# Patient Record
Sex: MALE | Race: WHITE | Employment: FULL TIME | URBAN - METROPOLITAN AREA
[De-identification: names, ages, dates, MRNs, and addresses within clinical notes are randomized per-mention and may not be internally consistent; named-entity substitution may affect disease eponyms.]

---

## 2021-02-04 ENCOUNTER — HOSPITAL ENCOUNTER (EMERGENCY)
Age: 61
Discharge: HOME | End: 2021-02-04
Attending: EMERGENCY MEDICINE
Payer: COMMERCIAL

## 2021-02-04 VITALS
RESPIRATION RATE: 18 BRPM | OXYGEN SATURATION: 98 % | DIASTOLIC BLOOD PRESSURE: 78 MMHG | HEART RATE: 73 BPM | SYSTOLIC BLOOD PRESSURE: 171 MMHG | TEMPERATURE: 98 F

## 2021-02-04 DIAGNOSIS — S01.01XA LACERATION OF SCALP, INITIAL ENCOUNTER: ICD-10-CM

## 2021-02-04 DIAGNOSIS — S09.90XA CLOSED HEAD INJURY, INITIAL ENCOUNTER: Primary | ICD-10-CM

## 2021-02-04 PROCEDURE — 99283 EMERGENCY DEPT VISIT LOW MDM: CPT

## 2021-02-04 PROCEDURE — 74011000250 HC RX REV CODE- 250: Performed by: PHYSICIAN ASSISTANT

## 2021-02-04 PROCEDURE — 75810000293 HC SIMP/SUPERF WND  RPR

## 2021-02-04 RX ORDER — BACITRACIN 500 UNIT/G
1 PACKET (EA) TOPICAL 3 TIMES DAILY
Status: DISCONTINUED | OUTPATIENT
Start: 2021-02-04 | End: 2021-02-04 | Stop reason: HOSPADM

## 2021-02-04 RX ORDER — LIDOCAINE HYDROCHLORIDE 20 MG/ML
10 INJECTION, SOLUTION INFILTRATION; PERINEURAL
Status: COMPLETED | OUTPATIENT
Start: 2021-02-04 | End: 2021-02-04

## 2021-02-04 RX ADMIN — BACITRACIN 1 PACKET: 500 OINTMENT TOPICAL at 01:26

## 2021-02-04 RX ADMIN — LIDOCAINE HYDROCHLORIDE 200 MG: 20 INJECTION, SOLUTION INFILTRATION; PERINEURAL at 01:24

## 2021-02-04 NOTE — ED PROVIDER NOTES
23-year-old male history of high cholesterol, hypertension presenting to the ED for a fall. Patient notes that just prior to arrival he was attempting to hang a picture up on a ladder, was maybe 1 to 2 feet off the ground, notes that his socks slipped on the wrong causing him to fall. Patient states he did not hit his head when he fell, but notes that the picture fell down off the wall and hit the back of his scalp, sustained a large laceration. Patient specifically denies headache, LOC, nausea, vomiting, vision changes, dizziness, numbness, weakness. Denies any prodromal symptoms including chest pain or shortness of breath. Denies recent illness. Does admit that he had \"2-1/2 beers\" tonight. Denies neck pain, back pain, chest pain, shortness of breath. Past medical history: As above  Social history: As above works at  for [employer]    The history is provided by the patient. Laceration          Past Medical History:   Diagnosis Date    HLD (hyperlipidemia)     Hypertension        History reviewed. No pertinent surgical history. History reviewed. No pertinent family history.     Social History     Socioeconomic History    Marital status: SINGLE     Spouse name: Not on file    Number of children: Not on file    Years of education: Not on file    Highest education level: Not on file   Occupational History    Not on file   Social Needs    Financial resource strain: Not on file    Food insecurity     Worry: Not on file     Inability: Not on file    Transportation needs     Medical: Not on file     Non-medical: Not on file   Tobacco Use    Smoking status: Never Smoker    Smokeless tobacco: Never Used   Substance and Sexual Activity    Alcohol use: Yes     Comment: 10 beers a week    Drug use: Not on file    Sexual activity: Not on file   Lifestyle    Physical activity     Days per week: Not on file     Minutes per session: Not on file    Stress: Not on file Relationships    Social connections     Talks on phone: Not on file     Gets together: Not on file     Attends [employer] witness service: Not on file     Active member of club or organization: Not on file     Attends meetings of clubs or organizations: Not on file     Relationship status: Not on file    Intimate partner violence     Fear of current or ex partner: Not on file     Emotionally abused: Not on file     Physically abused: Not on file     Forced sexual activity: Not on file   Other Topics Concern    Not on file   Social History Narrative    Not on file         ALLERGIES: Penicillin g    Review of Systems   Constitutional: Negative for fever. HENT: Negative for congestion. Eyes: Negative for discharge and redness. Respiratory: Negative for cough and shortness of breath. Cardiovascular: Negative for chest pain. Gastrointestinal: Negative for diarrhea and vomiting. Genitourinary: Negative for difficulty urinating. Musculoskeletal: Negative for joint swelling. Skin: Positive for wound. Neurological: Negative for syncope and headaches. Psychiatric/Behavioral: Negative for behavioral problems. All other systems reviewed and are negative. Vitals:    02/04/21 0055   BP: (!) 171/78   Pulse: 73   Resp: 18   Temp: 98 °F (36.7 °C)   SpO2: 98%            Physical Exam  Vitals signs and nursing note reviewed. Constitutional:       General: He is not in acute distress. Appearance: He is well-developed. Comments: Pleasant, well-appearing white male   HENT:      Head: Normocephalic. Comments: 10 cm horseshoe-shaped laceration to the occiput     Right Ear: External ear normal.      Left Ear: External ear normal.   Eyes:      General: No scleral icterus. Conjunctiva/sclera: Conjunctivae normal.   Neck:      Musculoskeletal: Neck supple. Trachea: No tracheal deviation.       Comments: Back exposed, no bruising, no midline C, T, L-spine tenderness  Cardiovascular:      Rate and Rhythm: Normal rate and regular rhythm. Heart sounds: Normal heart sounds. No murmur. No friction rub. No gallop. Pulmonary:      Effort: Pulmonary effort is normal. No respiratory distress. Breath sounds: Normal breath sounds. No stridor. No wheezing. Abdominal:      General: There is no distension. Palpations: Abdomen is soft. Tenderness: There is no abdominal tenderness. Comments: Chest and abdomen exposed, no bruising, no tenderness   Musculoskeletal: Normal range of motion. Skin:     General: Skin is warm and dry. Neurological:      General: No focal deficit present. Mental Status: He is alert and oriented to person, place, and time. GCS: GCS eye subscore is 4. GCS verbal subscore is 5. GCS motor subscore is 6. Psychiatric:         Behavior: Behavior normal.          MDM  Number of Diagnoses or Management Options  Closed head injury, initial encounter  Laceration of scalp, initial encounter  Diagnosis management comments: 56-year-old male presenting to the ED for scalp laceration, notes that he slipped off a ladder while hanging a picture, the picture frame fell and hit his head and then he fell down a few steps. Patient with large wound to the scalp that was repaired. Recommended CT scan, discussed with patient the given degree of trauma noted, the fact that he had 2 beers earlier in the evening, imaging is indicated, patient currently sober, of sound mind, states that since he did not actually hit his head while falling he does not feel like it is indicated, specifically denies any headache or neurologic symptoms and is not anticoagulated. Explained to patient that without scan we cannot guarantee that there is not intracranial injury related to the fall, patient voices understanding.        Amount and/or Complexity of Data Reviewed  Discuss the patient with other providers: yes (Dr. Norberto Leone, ED attending)           Wound Repair    Date/Time: 2/4/2021 1:48 AM  Performed by: [address] [place] [address] provider: Dr. Munira Jc ED attending  Preparation: skin prepped with Betadine  Pre-procedure re-eval: Immediately prior to the procedure, the patient was reevaluated and found suitable for the planned procedure and any planned medications. Location details: scalp  Wound length:7.6 - 12.5 cm (10cm)  Anesthesia: local infiltration    Anesthesia:  Local Anesthetic: lidocaine 2% without epinephrine  Anesthetic total: 4 mL  Foreign bodies: no foreign bodies  Irrigation solution: saline  Irrigation method: syringe  Debridement: none  Skin closure: staples  Number of sutures: 9  Approximation: close  Dressing: antibiotic ointment  Patient tolerance: patient tolerated the procedure well with no immediate complications  My total time at bedside, performing this procedure was 16-30 minutes.

## 2021-02-04 NOTE — ED NOTES
Discharge instructions given to patient by MD and nurse. Pt has given counseling on medication use and verbalizes understanding. Pt ambulated off unit in no signs of distress.